# Patient Record
(demographics unavailable — no encounter records)

---

## 2024-10-29 NOTE — PHYSICAL EXAM
[Healthy Appearance] : healthy appearance [No Acute Distress] : no acute distress [Normal Sclera/Conjunctiva] : normal sclera/conjunctiva [No Neck Mass] : no neck mass was observed [No LAD] : no lymphadenopathy [Supple] : the neck was supple [Thyroid Not Enlarged] : the thyroid was not enlarged [No Thyroid Nodules] : no palpable thyroid nodules [No Respiratory Distress] : no respiratory distress [Clear to Auscultation] : lungs were clear to auscultation bilaterally [Normal S1, S2] : normal S1 and S2 [No Murmurs] : no murmurs [Normal Rate] : heart rate was normal [Regular Rhythm] : with a regular rhythm [Normal Affect] : the affect was normal [Normal Insight/Judgement] : insight and judgment were intact [Normal Mood] : the mood was normal [Acanthosis Nigricans] : no acanthosis nigricans

## 2024-10-29 NOTE — HISTORY OF PRESENT ILLNESS
[FreeTextEntry1] : Follow up of Type 1 DM. Has a history of mild hypothyroidism, however TFTs have remained normal off LT4 now.    Quality:  type 1 DM Severity:  uncontrolled  Duration of diabetes:  since age 8 Associated Complications/ Symptoms: Hypoglycemia Modifying Factors:  Better with medication  Current Diabetic Medication Regimen: Tslim X2 insulin pump (upgraded in April 2022).  SMBG:  prior to CGM, was testing over 4 times a day. Using Dexcom G7:  Average BG is 158 mg/dl with CV of 29%.  66% of BG within target range, 33% above range and 0.2 % below range.   Entering very little CHO into the pump for boluses.

## 2024-10-29 NOTE — REVIEW OF SYSTEMS
[Recent Weight Gain (___ Lbs)] : recent weight gain: [unfilled] lbs [Palpitations] : no palpitations [Shortness Of Breath] : no shortness of breath

## 2025-01-10 NOTE — ASSESSMENT
[FreeTextEntry1] : 32 year old male with type 1 DM here for follow up of diabetes. He had mild hypothyroidism in the past, but has now remained euthyroid without further need for LT4. His diabetes control has worsened.  Give total amount of bolus and try not to wait to give the other half.  added carb ratio at 10 am 1:9 due to eating lunch at 10am.   Will set up eye exam appointment.   Check labs now.  Follow up in 3-4 months with NP and 6-7 months with MD.   CGM STATEMENT: This patient with diabetes - Is currently using CGM and is receiving insulin using an insulin pump - Is adjusting insulin dose using a correction factor programmed into the pump, as documented in the medical record - Has been seen in the office by a provider within the last six months to review CGM data with their provider - Has completed a diabetes education program Mal, Dexcom and Guardian 4 CGM require one test strip daily to use in case of sensor failure or for blood glucose verification or during sensor warmup. Guardian 3 CGM requires 6 test strips daily for calibration with automated pump and blood glucose correction. CGM is medically necessary as it can integrate with their insulin pump to allow for closed loop therapy.

## 2025-01-10 NOTE — PHYSICAL EXAM
[Alert] : alert [Well Nourished] : well nourished [No Acute Distress] : no acute distress [Well Developed] : well developed [Normal Sclera/Conjunctiva] : normal sclera/conjunctiva [No Proptosis] : no proptosis [No LAD] : no lymphadenopathy [Thyroid Not Enlarged] : the thyroid was not enlarged [No Thyroid Nodules] : no palpable thyroid nodules [No Respiratory Distress] : no respiratory distress [No Accessory Muscle Use] : no accessory muscle use [Normal Rate and Effort] : normal respiratory rate and effort [Clear to Auscultation] : lungs were clear to auscultation bilaterally [Normal S1, S2] : normal S1 and S2 [Normal Rate] : heart rate was normal [Regular Rhythm] : with a regular rhythm [Normal Bowel Sounds] : normal bowel sounds [Not Tender] : non-tender [Not Distended] : not distended [Soft] : abdomen soft [No Stigmata of Cushings Syndrome] : no stigmata of Cushings Syndrome [No Rash] : no rash [Acanthosis Nigricans] : no acanthosis nigricans [No Tremors] : no tremors [Oriented x3] : oriented to person, place, and time [Normal Affect] : the affect was normal [Normal Insight/Judgement] : insight and judgment were intact [Normal Mood] : the mood was normal

## 2025-01-10 NOTE — REVIEW OF SYSTEMS
[Recent Weight Gain (___ Lbs)] : recent weight gain: [unfilled] lbs [Headaches] : headaches [Fatigue] : no fatigue [Decreased Appetite] : appetite not decreased [Recent Weight Loss (___ Lbs)] : no recent weight loss [Visual Field Defect] : no visual field defect [Blurred Vision] : no blurred vision [Dysphagia] : no dysphagia [Neck Pain] : no neck pain [Dysphonia] : no dysphonia [Chest Pain] : no chest pain [Palpitations] : no palpitations [Constipation] : no constipation [Diarrhea] : no diarrhea [Polyuria] : no polyuria [Dysuria] : no dysuria [Tremors] : no tremors [Depression] : no depression [Anxiety] : no anxiety [Polydipsia] : no polydipsia [de-identified] : headaches once a while

## 2025-01-10 NOTE — HISTORY OF PRESENT ILLNESS
[FreeTextEntry1] : Follow up of Type 1 DM. Has a history of mild hypothyroidism, however TFTs have remained normal off LT4 now.  No recent hospitalizations or illness.   Quality: type 1 DM Severity: uncontrolled Duration of diabetes: since age 8 Associated Complications/ Symptoms: Hypoglycemia Modifying Factors: Better with medication  Current Diabetic Medication Regimen: Tslim X2 insulin pump (upgraded in April 2022).  SMBG: prior to CGM, was testing over 4 times a day. Using Dexcom G7: Average BG is 160 mg/dl with CV of 37%. 70% of BG within target range, 29% above range and <1% below range. Will bolus half the recommended amount then an hour later will give the rest of the recommended bolus. Patient reports he is doing better with carb counting.

## 2025-04-15 NOTE — REVIEW OF SYSTEMS
[Recent Weight Gain (___ Lbs)] : recent weight gain: [unfilled] lbs [Headaches] : headaches [Fatigue] : no fatigue [Decreased Appetite] : appetite not decreased [Visual Field Defect] : no visual field defect [Blurred Vision] : no blurred vision [Dysphagia] : no dysphagia [Neck Pain] : no neck pain [Dysphonia] : no dysphonia [Chest Pain] : no chest pain [Palpitations] : no palpitations [Constipation] : no constipation [Diarrhea] : no diarrhea [Polyuria] : no polyuria [Dysuria] : no dysuria [Tremors] : no tremors [Depression] : no depression [Anxiety] : no anxiety [Polydipsia] : no polydipsia [de-identified] : hx of headaches once in awhile

## 2025-04-15 NOTE — ASSESSMENT
[FreeTextEntry1] : 33 year old male with type 1 DM here for follow up of diabetes. He had mild hypothyroidism in the past, but has now remained euthyroid without further need for LT4. His diabetes control improving.   Give total amount of bolus and try not to wait to give the other half. During work hours if more active and blood sugar starts to go down place pump in exercise mode  Needs to see retina specialist.   Follow up in 3 months with MD.  CGM STATEMENT: This patient with diabetes - Is currently using CGM and is receiving insulin using an insulin pump - Is adjusting insulin dose using a correction factor programmed into the pump, as documented in the medical record - Has been seen in the office by a provider within the last six months to review CGM data with their provider - Has completed a diabetes education program Mal, Dexcom and Guardian 4 CGM require one test strip daily to use in case of sensor failure or for blood glucose verification or during sensor warmup. Guardian 3 CGM requires 6 test strips daily for calibration with automated pump and blood glucose correction. CGM is medically necessary as it can integrate with their insulin pump to allow for closed loop therapy.

## 2025-04-15 NOTE — HISTORY OF PRESENT ILLNESS
[FreeTextEntry1] : Follow up of Type 1 DM. Has a history of mild hypothyroidism, however TFTs have remained normal off LT4 now.  No recent hospitalizations or illness.  Quality: type 1 DM Severity: uncontrolled Duration of diabetes: since age 8 Associated Complications/ Symptoms: Hypoglycemia, mild DR Modifying Factors: Better with medication  Current Diabetic Medication Regimen: Tslim X2 insulin pump (upgraded in April 2022).  SMBG: prior to CGM, was testing over 4 times a day. Current  Using Dexcom G7: Average BG is 171 mg/dl with CV of 40%. 66% of BG within target range, 34% above range and <1% below range. Will bolus half the recommended amount then an hour later will give the rest of the recommended bolus. Patient reports he is doing better with carb counting.